# Patient Record
Sex: FEMALE | Race: AMERICAN INDIAN OR ALASKA NATIVE | URBAN - METROPOLITAN AREA
[De-identification: names, ages, dates, MRNs, and addresses within clinical notes are randomized per-mention and may not be internally consistent; named-entity substitution may affect disease eponyms.]

---

## 2019-06-05 RX ORDER — SERTRALINE HYDROCHLORIDE 100 MG/1
100 TABLET, FILM COATED ORAL DAILY
COMMUNITY

## 2019-06-05 RX ORDER — CYCLOBENZAPRINE HCL 10 MG
10 TABLET ORAL 3 TIMES DAILY PRN
Status: ON HOLD | COMMUNITY
End: 2019-09-05

## 2019-06-05 RX ORDER — MELOXICAM 15 MG/1
15 TABLET ORAL DAILY
Status: ON HOLD | COMMUNITY
End: 2019-09-05

## 2019-06-05 RX ORDER — ALBUTEROL SULFATE 90 UG/1
1-2 AEROSOL, METERED RESPIRATORY (INHALATION) EVERY 6 HOURS PRN
COMMUNITY

## 2019-06-05 RX ORDER — BUPROPION HYDROCHLORIDE 150 MG/1
150 TABLET ORAL EVERY MORNING
COMMUNITY

## 2019-06-05 RX ORDER — DIAZEPAM 5 MG
5 TABLET ORAL EVERY 6 HOURS PRN
COMMUNITY

## 2019-06-05 RX ORDER — MULTIVITAMIN WITH IRON
2 TABLET ORAL DAILY PRN
Status: ON HOLD | COMMUNITY
End: 2019-09-05

## 2019-06-05 RX ORDER — ERGOCALCIFEROL 1.25 MG/1
50000 CAPSULE, LIQUID FILLED ORAL
COMMUNITY

## 2019-06-05 RX ORDER — FLUTICASONE PROPIONATE 50 MCG
1 SPRAY, SUSPENSION (ML) NASAL DAILY
Status: ON HOLD | COMMUNITY
End: 2019-09-05

## 2019-06-05 RX ORDER — BUDESONIDE AND FORMOTEROL FUMARATE DIHYDRATE 160; 4.5 UG/1; UG/1
2 AEROSOL RESPIRATORY (INHALATION) 2 TIMES DAILY
COMMUNITY

## 2019-06-05 SDOH — HEALTH STABILITY: MENTAL HEALTH: HOW OFTEN DO YOU HAVE A DRINK CONTAINING ALCOHOL?: NEVER

## 2019-06-06 ENCOUNTER — HOSPITAL ENCOUNTER (OUTPATIENT)
Facility: CLINIC | Age: 34
Discharge: HOME | End: 2019-06-06
Attending: ANESTHESIOLOGY | Admitting: ANESTHESIOLOGY
Payer: COMMERCIAL

## 2019-06-06 ENCOUNTER — APPOINTMENT (OUTPATIENT)
Dept: GENERAL RADIOLOGY | Facility: CLINIC | Age: 34
End: 2019-06-06
Attending: ANESTHESIOLOGY
Payer: COMMERCIAL

## 2019-06-06 ENCOUNTER — ANESTHESIA EVENT (OUTPATIENT)
Dept: SURGERY | Facility: CLINIC | Age: 34
End: 2019-06-06
Payer: COMMERCIAL

## 2019-06-06 ENCOUNTER — ANESTHESIA (OUTPATIENT)
Dept: SURGERY | Facility: CLINIC | Age: 34
End: 2019-06-06
Payer: COMMERCIAL

## 2019-06-06 VITALS
HEIGHT: 65 IN | DIASTOLIC BLOOD PRESSURE: 81 MMHG | BODY MASS INDEX: 21.66 KG/M2 | TEMPERATURE: 99.8 F | SYSTOLIC BLOOD PRESSURE: 124 MMHG | HEART RATE: 98 BPM | OXYGEN SATURATION: 96 % | WEIGHT: 130 LBS | RESPIRATION RATE: 19 BRPM

## 2019-06-06 LAB — HCG UR QL: NEGATIVE

## 2019-06-06 PROCEDURE — 25000125 ZZHC RX 250: Performed by: ANESTHESIOLOGY

## 2019-06-06 PROCEDURE — 25000125 ZZHC RX 250: Performed by: NURSE ANESTHETIST, CERTIFIED REGISTERED

## 2019-06-06 PROCEDURE — C1820 GENERATOR NEURO RECHG BAT SY: HCPCS | Performed by: ANESTHESIOLOGY

## 2019-06-06 PROCEDURE — 25000128 H RX IP 250 OP 636: Performed by: NURSE ANESTHETIST, CERTIFIED REGISTERED

## 2019-06-06 PROCEDURE — 71000012 ZZH RECOVERY PHASE 1 LEVEL 1 FIRST HR: Performed by: ANESTHESIOLOGY

## 2019-06-06 PROCEDURE — 37000008 ZZH ANESTHESIA TECHNICAL FEE, 1ST 30 MIN: Performed by: ANESTHESIOLOGY

## 2019-06-06 PROCEDURE — 27210794 ZZH OR GENERAL SUPPLY STERILE: Performed by: ANESTHESIOLOGY

## 2019-06-06 PROCEDURE — 37000009 ZZH ANESTHESIA TECHNICAL FEE, EACH ADDTL 15 MIN: Performed by: ANESTHESIOLOGY

## 2019-06-06 PROCEDURE — 25000128 H RX IP 250 OP 636: Performed by: ANESTHESIOLOGY

## 2019-06-06 PROCEDURE — 40000277 XR SURGERY CARM FLUORO LESS THAN 5 MIN W STILLS

## 2019-06-06 PROCEDURE — 81025 URINE PREGNANCY TEST: CPT | Performed by: ANESTHESIOLOGY

## 2019-06-06 PROCEDURE — 36000063 ZZH SURGERY LEVEL 4 EA 15 ADDTL MIN: Performed by: ANESTHESIOLOGY

## 2019-06-06 PROCEDURE — 36000065 ZZH SURGERY LEVEL 4 W FLUORO 1ST 30 MIN: Performed by: ANESTHESIOLOGY

## 2019-06-06 PROCEDURE — 71000027 ZZH RECOVERY PHASE 2 EACH 15 MINS: Performed by: ANESTHESIOLOGY

## 2019-06-06 PROCEDURE — C1778 LEAD, NEUROSTIMULATOR: HCPCS | Performed by: ANESTHESIOLOGY

## 2019-06-06 PROCEDURE — 27211024 ZZHC OR SUPPLY OTHER OPNP: Performed by: ANESTHESIOLOGY

## 2019-06-06 PROCEDURE — 25800030 ZZH RX IP 258 OP 636: Performed by: NURSE ANESTHETIST, CERTIFIED REGISTERED

## 2019-06-06 PROCEDURE — 40000170 ZZH STATISTIC PRE-PROCEDURE ASSESSMENT II: Performed by: ANESTHESIOLOGY

## 2019-06-06 DEVICE — IMPLANTABLE DEVICE: Type: IMPLANTABLE DEVICE | Status: FUNCTIONAL

## 2019-06-06 RX ORDER — FENTANYL CITRATE 50 UG/ML
25-50 INJECTION, SOLUTION INTRAMUSCULAR; INTRAVENOUS EVERY 5 MIN PRN
Status: DISCONTINUED | OUTPATIENT
Start: 2019-06-06 | End: 2019-06-06 | Stop reason: HOSPADM

## 2019-06-06 RX ORDER — PROPOFOL 10 MG/ML
INJECTION, EMULSION INTRAVENOUS PRN
Status: DISCONTINUED | OUTPATIENT
Start: 2019-06-06 | End: 2019-06-06

## 2019-06-06 RX ORDER — ONDANSETRON 2 MG/ML
4 INJECTION INTRAMUSCULAR; INTRAVENOUS EVERY 30 MIN PRN
Status: DISCONTINUED | OUTPATIENT
Start: 2019-06-06 | End: 2019-06-06 | Stop reason: HOSPADM

## 2019-06-06 RX ORDER — FENTANYL CITRATE 50 UG/ML
INJECTION, SOLUTION INTRAMUSCULAR; INTRAVENOUS PRN
Status: DISCONTINUED | OUTPATIENT
Start: 2019-06-06 | End: 2019-06-06

## 2019-06-06 RX ORDER — SODIUM CHLORIDE, SODIUM LACTATE, POTASSIUM CHLORIDE, CALCIUM CHLORIDE 600; 310; 30; 20 MG/100ML; MG/100ML; MG/100ML; MG/100ML
INJECTION, SOLUTION INTRAVENOUS CONTINUOUS PRN
Status: DISCONTINUED | OUTPATIENT
Start: 2019-06-06 | End: 2019-06-06

## 2019-06-06 RX ORDER — SCOLOPAMINE TRANSDERMAL SYSTEM 1 MG/1
1 PATCH, EXTENDED RELEASE TRANSDERMAL ONCE
Status: COMPLETED | OUTPATIENT
Start: 2019-06-06 | End: 2019-06-06

## 2019-06-06 RX ORDER — PROPOFOL 10 MG/ML
INJECTION, EMULSION INTRAVENOUS CONTINUOUS PRN
Status: DISCONTINUED | OUTPATIENT
Start: 2019-06-06 | End: 2019-06-06

## 2019-06-06 RX ORDER — DEXTROSE MONOHYDRATE 25 G/50ML
25-50 INJECTION, SOLUTION INTRAVENOUS
Status: DISCONTINUED | OUTPATIENT
Start: 2019-06-06 | End: 2019-06-06 | Stop reason: HOSPADM

## 2019-06-06 RX ORDER — CEFAZOLIN SODIUM 2 G/100ML
2 INJECTION, SOLUTION INTRAVENOUS
Status: COMPLETED | OUTPATIENT
Start: 2019-06-06 | End: 2019-06-06

## 2019-06-06 RX ORDER — ONDANSETRON 4 MG/1
4 TABLET, ORALLY DISINTEGRATING ORAL EVERY 30 MIN PRN
Status: DISCONTINUED | OUTPATIENT
Start: 2019-06-06 | End: 2019-06-06 | Stop reason: HOSPADM

## 2019-06-06 RX ORDER — LIDOCAINE HYDROCHLORIDE 20 MG/ML
INJECTION, SOLUTION INFILTRATION; PERINEURAL PRN
Status: DISCONTINUED | OUTPATIENT
Start: 2019-06-06 | End: 2019-06-06

## 2019-06-06 RX ORDER — NALOXONE HYDROCHLORIDE 0.4 MG/ML
.1-.4 INJECTION, SOLUTION INTRAMUSCULAR; INTRAVENOUS; SUBCUTANEOUS
Status: DISCONTINUED | OUTPATIENT
Start: 2019-06-06 | End: 2019-06-06 | Stop reason: HOSPADM

## 2019-06-06 RX ORDER — HYDROMORPHONE HYDROCHLORIDE 1 MG/ML
.3-.5 INJECTION, SOLUTION INTRAMUSCULAR; INTRAVENOUS; SUBCUTANEOUS EVERY 10 MIN PRN
Status: DISCONTINUED | OUTPATIENT
Start: 2019-06-06 | End: 2019-06-06 | Stop reason: HOSPADM

## 2019-06-06 RX ORDER — LIDOCAINE 40 MG/G
CREAM TOPICAL
Status: DISCONTINUED | OUTPATIENT
Start: 2019-06-06 | End: 2019-06-06 | Stop reason: HOSPADM

## 2019-06-06 RX ORDER — SODIUM CHLORIDE, SODIUM LACTATE, POTASSIUM CHLORIDE, CALCIUM CHLORIDE 600; 310; 30; 20 MG/100ML; MG/100ML; MG/100ML; MG/100ML
INJECTION, SOLUTION INTRAVENOUS CONTINUOUS
Status: DISCONTINUED | OUTPATIENT
Start: 2019-06-06 | End: 2019-06-06 | Stop reason: HOSPADM

## 2019-06-06 RX ORDER — NICOTINE POLACRILEX 4 MG
15-30 LOZENGE BUCCAL
Status: DISCONTINUED | OUTPATIENT
Start: 2019-06-06 | End: 2019-06-06 | Stop reason: HOSPADM

## 2019-06-06 RX ORDER — ONDANSETRON 2 MG/ML
INJECTION INTRAMUSCULAR; INTRAVENOUS PRN
Status: DISCONTINUED | OUTPATIENT
Start: 2019-06-06 | End: 2019-06-06

## 2019-06-06 RX ORDER — DEXAMETHASONE SODIUM PHOSPHATE 4 MG/ML
INJECTION, SOLUTION INTRA-ARTICULAR; INTRALESIONAL; INTRAMUSCULAR; INTRAVENOUS; SOFT TISSUE PRN
Status: DISCONTINUED | OUTPATIENT
Start: 2019-06-06 | End: 2019-06-06

## 2019-06-06 RX ADMIN — MIDAZOLAM 2 MG: 1 INJECTION INTRAMUSCULAR; INTRAVENOUS at 14:26

## 2019-06-06 RX ADMIN — DEXMEDETOMIDINE HYDROCHLORIDE 8 MCG: 100 INJECTION, SOLUTION INTRAVENOUS at 14:30

## 2019-06-06 RX ADMIN — CEFAZOLIN SODIUM 2 G: 2 INJECTION, SOLUTION INTRAVENOUS at 14:43

## 2019-06-06 RX ADMIN — PHENYLEPHRINE HYDROCHLORIDE 100 MCG: 10 INJECTION INTRAVENOUS at 14:49

## 2019-06-06 RX ADMIN — DEXMEDETOMIDINE HYDROCHLORIDE 4 MCG: 100 INJECTION, SOLUTION INTRAVENOUS at 14:44

## 2019-06-06 RX ADMIN — ONDANSETRON 4 MG: 2 INJECTION INTRAMUSCULAR; INTRAVENOUS at 15:10

## 2019-06-06 RX ADMIN — FENTANYL CITRATE 50 MCG: 50 INJECTION, SOLUTION INTRAMUSCULAR; INTRAVENOUS at 17:21

## 2019-06-06 RX ADMIN — FENTANYL CITRATE 50 MCG: 50 INJECTION, SOLUTION INTRAMUSCULAR; INTRAVENOUS at 15:46

## 2019-06-06 RX ADMIN — SODIUM CHLORIDE, POTASSIUM CHLORIDE, SODIUM LACTATE AND CALCIUM CHLORIDE: 600; 310; 30; 20 INJECTION, SOLUTION INTRAVENOUS at 14:26

## 2019-06-06 RX ADMIN — SCOPALAMINE 1 PATCH: 1 PATCH, EXTENDED RELEASE TRANSDERMAL at 14:22

## 2019-06-06 RX ADMIN — PROPOFOL 100 MCG/KG/MIN: 10 INJECTION, EMULSION INTRAVENOUS at 14:29

## 2019-06-06 RX ADMIN — PROPOFOL 20 MG: 10 INJECTION, EMULSION INTRAVENOUS at 14:41

## 2019-06-06 RX ADMIN — DEXAMETHASONE SODIUM PHOSPHATE 4 MG: 4 INJECTION, SOLUTION INTRA-ARTICULAR; INTRALESIONAL; INTRAMUSCULAR; INTRAVENOUS; SOFT TISSUE at 15:10

## 2019-06-06 RX ADMIN — DEXMEDETOMIDINE HYDROCHLORIDE 12 MCG: 100 INJECTION, SOLUTION INTRAVENOUS at 15:50

## 2019-06-06 RX ADMIN — FENTANYL CITRATE 50 MCG: 50 INJECTION, SOLUTION INTRAMUSCULAR; INTRAVENOUS at 14:37

## 2019-06-06 RX ADMIN — DEXMEDETOMIDINE HYDROCHLORIDE 8 MCG: 100 INJECTION, SOLUTION INTRAVENOUS at 14:37

## 2019-06-06 RX ADMIN — LIDOCAINE HYDROCHLORIDE 80 MG: 20 INJECTION, SOLUTION INFILTRATION; PERINEURAL at 14:29

## 2019-06-06 ASSESSMENT — LIFESTYLE VARIABLES: TOBACCO_USE: 1

## 2019-06-06 ASSESSMENT — COPD QUESTIONNAIRES: COPD: 0

## 2019-06-06 ASSESSMENT — ENCOUNTER SYMPTOMS: SEIZURES: 1

## 2019-06-06 ASSESSMENT — MIFFLIN-ST. JEOR: SCORE: 1282.62

## 2019-06-06 NOTE — PROGRESS NOTES
Admission medication history interview status for the 6/6/2019  admission is complete. See EPIC admission navigator for prior to admission medications     Medication history source reliability:Moderate    Medication history interview source(s):Patient    Medication history resources (including written lists, pill bottles, clinic record): Written list    [facility]    Additional medication history information not noted on PTA med list :   Pt brought in Albuterol inhaler    Time spent in this activity: 45 mins    Prior to Admission medications    Medication Sig Last Dose Taking? Auth Provider   albuterol (PROAIR HFA/PROVENTIL HFA/VENTOLIN HFA) 108 (90 Base) MCG/ACT inhaler Inhale 1-2 puffs into the lungs every 6 hours as needed for shortness of breath / dyspnea or wheezing Past Week at prn Yes Reported, Patient   budesonide-formoterol (SYMBICORT) 160-4.5 MCG/ACT Inhaler Inhale 2 puffs into the lungs 2 times daily over a month Yes Reported, Patient   Buprenorphine HCl (BELBUCA) 75 MCG FILM buccal film Place 75 mcg inside cheek every 12 hours 6/5/2019 at 2200 Yes Reported, Patient   buPROPion (WELLBUTRIN XL) 150 MG 24 hr tablet Take 150 mg by mouth every morning 6/6/2019 at 0800 Yes Reported, Patient   cyclobenzaprine (FLEXERIL) 10 MG tablet Take 10 mg by mouth 3 times daily as needed for muscle spasms 6/4/2019 at prn Yes Reported, Patient   diazepam (VALIUM) 5 MG tablet Take 5 mg by mouth every 6 hours as needed for anxiety 6/5/2019 at 1800 Yes Reported, Patient   fluticasone (FLONASE) 50 MCG/ACT nasal spray Spray 1 spray into both nostrils daily  over a month Yes Reported, Patient   ibuprofen (ADVIL/MOTRIN) 800 MG tablet Take 800 mg by mouth every 8 hours as needed  6/5/2019 at 1400 Yes Reported, Patient   lisdexamfetamine (VYVANSE) 70 MG capsule Take 70 mg by mouth every morning  6/5/2019 at 1000 Yes Reported, Patient   magnesium 250 MG tablet Take 2 tablets by mouth daily as needed  Past  Week at prn Yes Reported, Patient   meloxicam (MOBIC) 15 MG tablet Take 15 mg by mouth daily 5/30/2019 Yes Reported, Patient   nicotine (NICOTROL) 10 MG/ML SOLN inhalation solution Spray 1 spray in nostril every 2 hours as needed for smoking cessation 6/5/2019 at 2200 Yes Reported, Patient   SERTRALINE HCL PO Take 150 mg by mouth daily  6/6/2019 at 0800 Yes Reported, Patient   vitamin D2 (ERGOCALCIFEROL) 48239 units (1250 mcg) capsule Take 50,000 Units by mouth twice a week (tuesdays & Thursdays) over a week Yes Reported, Patient

## 2019-06-06 NOTE — ANESTHESIA PREPROCEDURE EVALUATION
Anesthesia Pre-Procedure Evaluation    Patient: Linda Franklin   MRN: [id number] : 1985          Preoperative Diagnosis: INTERVERTEBRAL DISC DISORDER WOTH RADICULOPATHY    Procedure(s):  EXPLANT (NUVERTRA) SPINAL CORD STIMULATOR AND REPLACEMENT, SPINAL CORD STIMULATOR, DORSAL COLUMN ([employer])    Past Medical History:   Diagnosis Date     Abnormal Pap smear      ADHD      Adjustment disorder      Anxiety      Asthma      Bipolar 2 disorder (H)      Borderline personality disorder (H)      Chlamydia      Chronic back pain      Depression, major      Endometriosis      Endometriosis      Genital herpes      HTN (hypertension)      Lumbar disc disease      Personality disorder (H)      PTSD (post-traumatic stress disorder)      PTSD (post-traumatic stress disorder)      Renal cyst      Renal cyst      Seizure (H)      Spinal cord stimulator status      Past Surgical History:   Procedure Laterality Date     ABDOMEN SURGERY       x 2     CHOLECYSTECTOMY       GYN SURGERY      Nexplanon Implanted left arm-expires 18     HC INSERTION INTRAUTERINE DEVICE       PELVIS LAPAROSCOPY,DX      x3       Anesthesia Evaluation     . Pt has had prior anesthetic. Type: General    No history of anesthetic complications          ROS/MED HX    ENT/Pulmonary:     (+)tobacco use, Current use asthma , . .   (-) COPD   Neurologic:     (+)seizures (grand mal/ petitie mal, may be pseudoseizures) last seizure: >10 years    (-) CVA, TIA and Neuropathy   Cardiovascular:     (+) hypertension----. : . . . :. .      (-) CAD, irregular heartbeat/palpitations and stent   METS/Exercise Tolerance:     Hematologic:        (-) anemia   Musculoskeletal:         GI/Hepatic:        (-) GERD and liver disease   Renal/Genitourinary: Comment: Renal cyst     (-) renal disease   Endo:      (-) Type I DM, Type II DM and thyroid disease   Psychiatric:     (+) psychiatric history bipolar      Infectious Disease:  - neg infectious  "disease ROS       Malignancy:         Other: Comment: Lupus   (+) H/O Chronic Pain,H/O chronic opiod use ,                         Physical Exam  Normal systems: cardiovascular and pulmonary    Airway   Mallampati: II  TM distance: >3 FB  Neck ROM: full    Dental   (+) missing and upper dentures    Cardiovascular   Rhythm and rate: regular and normal      Pulmonary    breath sounds clear to auscultation            Lab Results   Component Value Date    HCG Negative 06/06/2019       Preop Vitals  BP Readings from Last 3 Encounters:   06/06/19 124/73    Pulse Readings from Last 3 Encounters:   06/06/19 85      Resp Readings from Last 3 Encounters:   06/06/19 16    SpO2 Readings from Last 3 Encounters:   06/06/19 98%      Temp Readings from Last 1 Encounters:   06/06/19 36.6  C (97.9  F) (Oral)    Ht Readings from Last 1 Encounters:   06/06/19 1.638 m (5' 4.5\")      Wt Readings from Last 1 Encounters:   06/06/19 59 kg (130 lb)    Estimated body mass index is 21.97 kg/m  as calculated from the following:    Height as of this encounter: 1.638 m (5' 4.5\").    Weight as of this encounter: 59 kg (130 lb).       Anesthesia Plan      History & Physical Review  History and physical reviewed and following examination; no interval change.    ASA Status:  2 .    NPO Status:  > 6 hours    Plan for MAC (with GA backup) with Intravenous induction. Maintenance will be TIVA.    PONV prophylaxis:  Ondansetron (or other 5HT-3), Dexamethasone or Solumedrol and Scopolamine patch       Postoperative Care  Postoperative pain management:  Oral pain medications.      Consents  Anesthetic plan, risks, benefits and alternatives discussed with:  Patient (Including possibility of intraoperative awareness or recall.)..                 Yunior Arora MD  "

## 2019-06-06 NOTE — DISCHARGE INSTRUCTIONS
Information for Patients Discharging with a Transderm Scopolamine Patch     Dry mouth is a common side effect.    Drowsiness is another common side effect especially when combined with pain medication.  Please avoid activities that require mental alertness such as driving a car or making important legal decisions.    Since Scopolamine can cause temporary dilation of the pupils and blurred vision if it comes in contact with the eyes; be sure to wash your hands thoroughly with soap and water immediately after handling the patch.   When you remove your patch, please stick it to a tissue or paper towel for disposal.      Remove the patch immediately and contact a physician in the unlikely event that you experience symptoms of acute glaucoma (pain and reddening of the eyes, accompanied by dilated pupils).    Remove the patch if you develop any difficulties urinating.  If you cannot urinate after removing your patch, please notify your surgeon.    Remove the patch 24 hours after surgery.      Same Day Surgery Discharge Instructions for  Sedation and General Anesthesia       It's not unusual to feel dizzy, light-headed or faint for up to 24 hours after surgery or while taking pain medication.  If you have these symptoms: sit for a few minutes before standing and have someone assist you when you get up to walk or use the bathroom.      You should rest and relax for the next 24 hours. We recommend you make arrangements to have an adult stay with you for at least 24 hours after your discharge.  Avoid hazardous and strenuous activity.      DO NOT DRIVE any vehicle or operate mechanical equipment for 24 hours following the end of your surgery.  Even though you may feel normal, your reactions may be affected by the medication you have received.      Do not drink alcoholic beverages for 24 hours following surgery.       Slowly progress to your regular diet as you feel able. It's not unusual to feel nauseated and/or vomit after  receiving anesthesia.  If you develop these symptoms, drink clear liquids (apple juice, ginger ale, broth, 7-up, etc. ) until you feel better.  If your nausea and vomiting persists for 24 hours, please notify your surgeon.        All narcotic pain medications, along with inactivity and anesthesia, can cause constipation. Drinking plenty of liquids and increasing fiber intake will help.      For any questions of a medical nature, call your surgeon.      Do not make important decisions for 24 hours.      If you had general anesthesia, you may have a sore throat for a couple of days related to the breathing tube used during surgery.  You may use Cepacol lozenges to help with this discomfort.  If it worsens or if you develop a fever, contact your surgeon.       If you feel your pain is not well managed with the pain medications prescribed by your surgeon, please contact your surgeon's office to let them know so they can address your concerns.         Spinal Cord Stimulator Implant Discharge Instruction    After Surgery:     Resume light activity as tolerated    Restrict bending, lifting and twisting to reduce the chance of your leads moving for 6-8 weeks    Resume your regular pain medication regimen    Begin taking your oral antibiotic the day after your surgery.  It is important you finish the entire course of medication until gone.    Driving is not recommended while your stimulator is turned on    A small amount of blood visible on the bandages is normal, if any leaks out of the bandage contact [facility]    Site Care:    DO NOT remove any bandages from the surgery.  A RN or your provider will remove the bandages at your follow-up appointment at [facility].    Your incisions have been closed with dissolvable stitches.  There are steri-strips covering your incision under your bandages.  Do not remove these, they will fall off on their own in 5-7 days.    After the bandages are removed, check  your incision sites daily.  Keep sites clean and dry.    During the first few days, you may notice some swelling or discoloration around the incision sites, which is normal.  Some fluid (yellow to light red to orange) may ooze or leak from the incisions. However, if the fluid is foul smelling, thick, or does not decrease in amount, call [facility].    DO NOT shower until your bandages have been removed.    NO soaking your incisions for 2 weeks.  This includes baths, whirlpools, swimming pools, and hot tubs.    Call [facility] during business hours or the on-call call provider after hours if you develop any of the following:    Signs of infection such as: fevers, chills, increased pain, drainage (as mentioned above), redness, and swelling from your incision sites.    Headache persisting for more than 48 hours    Unusual changes in or stopping of sensation in your legs or back.     Uncontrolled pain.     Signs or symptoms of a deep vein thrombosis (DVT) : swelling in one orbrboth legs, pain or tenderness in one or both legs, warm skin on your leg, and/or red or discolored skin on your leg    [facility]  [phone]    After hours on-call provider     Emergency Situations go to the Emergency Department or Call 911:    Sudden severe back pain    Sudden onset of leg weakness and spasms    Loss of bladder control and/or bowel function    Signs or symptoms or a pulmonary embolism (PE): sudden coughing --which may bring up blood, sharp chest pain, rapid breathing or shortness of breath, and/or severe light headedness         **If you have questions or concerns about your procedure,  call Dr. Watson at [phone]**

## 2019-06-06 NOTE — ANESTHESIA CARE TRANSFER NOTE
Patient: Linda Franklin    Procedure(s):  EXPLANT (NUVERTRA) SPINAL CORD STIMULATOR AND REPLACEMENT, SPINAL CORD STIMULATOR, DORSAL COLUMN ([employer])    Diagnosis: INTERVERTEBRAL DISC DISORDER WOTH RADICULOPATHY  Diagnosis Additional Information: No value filed.    Anesthesia Type:   MAC     Note:  Airway :Room Air  Patient transferred to:PACU  Handoff Report: Identifed the Patient, Identified the Reponsible Provider, Reviewed the pertinent medical history, Discussed the surgical course, Reviewed Intra-OP anesthesia mangement and issues during anesthesia, Set expectations for post-procedure period and Allowed opportunity for questions and acknowledgement of understanding      Vitals: (Last set prior to Anesthesia Care Transfer)    CRNA VITALS  6/6/2019 1611 - 6/6/2019 1642      6/6/2019             Resp Rate (set):  10                Electronically Signed By: EMMETT Bocanegra CRNA  June 6, 2019  4:42 PM

## 2019-06-07 NOTE — ANESTHESIA POSTPROCEDURE EVALUATION
Patient: Linda Franklin    Procedure(s):  EXPLANT (NUVERTRA) SPINAL CORD STIMULATOR AND REPLACEMENT, SPINAL CORD STIMULATOR, DORSAL COLUMN ([employer])    Diagnosis:INTERVERTEBRAL DISC DISORDER WOTH RADICULOPATHY  Diagnosis Additional Information: No value filed.    Anesthesia Type:  MAC    Note:  Anesthesia Post Evaluation    Patient location during evaluation: PACU  Patient participation: Able to fully participate in evaluation  Level of consciousness: awake and alert  Pain management: adequate  Airway patency: patent  Cardiovascular status: acceptable  Respiratory status: acceptable  Hydration status: acceptable  PONV: none     Anesthetic complications: None          Last vitals:  Vitals:    06/06/19 1715 06/06/19 1730 06/06/19 1745   BP: 120/80 122/85 124/81   Pulse: 81 93 98   Resp: 19 12 19   Temp: 37.7  C (99.9  F) 37.7  C (99.8  F)    SpO2: 95% 96% 96%         Electronically Signed By: William Swanson MD  June 6, 2019  10:25 PM

## 2019-06-28 NOTE — OP NOTE
Name:    Linda Franklin    :    1985  Procedure Date: 2019   Surgeon:   Aguilar Watson MD  Location:   [facility]  Procedure:   Nuvectra Spinal Cord Stimulator Explant and [employer] Spinal Cord Stimulator     Implant- cervical and thoracic  Indication:    Intervertebral disc disorders w radiculopathy, lumbar region M51.16      Allergies:  Ingredient Reaction (Severity) Medication Name Comment   CODEINE PHOSPHATE seizure Tylenol-Codeine #3    FLUTICASONE PROPIONATE increased asthma symptoms Advair Diskus    LATEX      SALMETEROL XINAFOATE increased asthma symptoms Advair Diskus        Anesthesia: MAC sedation and local anesthetic were utilized for this procedure.     Explants: All explants were [employer] products. The lead lot numbers were [id number], [id number], and [id number]. The Nuvectra implantable pulse generator was serial number [id number].    All implants were [employer] products. The lead serial numbers were SC-2352-70 SN [id number], SC-2352-70 SN [id number], SC-2352-50 SN [id number], and SC-2352-50 SN [id number].     Description of Procedure: After discussing potential risks and benefits, the patient did wish to proceed and signed a written consent form.  The operative site was marked in the preoperative area. A [employer] representative met with the patient and answered all questions. An antibiotic prescription was given for the patient to use for the next 10 days for prophylaxis. The patient was brought into the operating room and positioned prone on the operating table taking care to relieve all pressure points and place extremities in a comfortable position.  MAC anesthesia was induced and the appropriate level was identified using fluoroscopy.  Aguilar Watson MD initiated a surgical time out stating that patients name, date of birth, allergies, and procedure to be performed. Xylocaine was injected to provider anesthesia. The  with the battery was opened. The leads  were disconnected. The Nuvectra battery was removed. A paramedian incision was then made in the mid back below this lulu.  Additional Xylocaine was injected to provide additional anesthesia.  The (3) Eight contact compact spinal cord stimulator leads were removed.    We then proceeded with implanting the new [employer] spinal cord stimulator. A 14 gauge Tuohy needle was then introduced under fluoroscopy. The T2-T3 epidural space was entered as a loss of resistance was felt with an air filled syringe. The Eight contact compact spinal cord stimulator lead was advanced to the C3/C4/C5 vertebral level. This lead was left of midline. A second Eight contact compact spinal cord stimulator lead was placed through the T2-T3 epidural space and advanced to the C3/C4/C5 vertebral level. This lead was right of midline. A third Eight contact compact spinal cord stimulator lead was placed through the T12-L1 epidural space and advanced to the top of the T7-T8 vertebral level. This lead was right of midline. Lateral and AP fluoroscopy views were obtained and confirmed appropriate posterior epidural placement.  A fourth Eight contact compact spinal cord stimulator lead was placed through the T12-L1 epidural space and advanced to the top of the T7-T8 vertebral level. This lead was left of midline. Lateral and AP fluoroscopy views were obtained and confirmed appropriate posterior epidural placement.      The anchors were placed and tightened around the leads using and then to the fascia using 2.0 silk sutures. A superficial gluteal pocket was developed on the left side.  A subcutaneous tunnel was made and the cables were pulled through to the gluteal pocket. The cable ends were connected to the IPG and all connections were secured to torque. An impedance check was performed and all impedances were within normal limits. The IPG and excess cable were then placed into the gluteal pocket with the excess cable coiled behind the IPG.  The wounds were then irrigated profusely with saline solution. The incisions were closed in layers using absorbable sutures. Steri-strips were used as well as sutures. Final X-ray revealed no significant changes in lead positions.      The patient was awakened, positioned supine and delivered to the recovery  room. Discharge post-op care instructions were reviewed with the patient, and the patient verbalized understanding and was provided with a written copy. IV was discontinued with catheter intact and patient was then discharged.     The wounds were then irrigated profusely with saline solution. The incisions were closed in layers using absorbable sutures. Steri-strips and island dressings were also applied.      The patient was awakened, positioned supine, and brought back to the recovery area where they would be monitored by a registered nurse. Physiologic parameters were observed utilizing pulse oximetry and blood pressure checks every ten minutes in recovery. Monitoring revealed normal oxygen saturation and pulse rate. The patient tolerated the procedure well.  The patient was able to ambulate without assistance. Post procedure instructions were given to the patient and the patient verbalized understanding. All questions were answered.    Aguilar Watson MD   June 6, 2019

## 2019-09-04 RX ORDER — OMEPRAZOLE 40 MG/1
40 CAPSULE, DELAYED RELEASE ORAL DAILY PRN
COMMUNITY

## 2019-09-04 RX ORDER — ONDANSETRON 4 MG/1
4 TABLET, ORALLY DISINTEGRATING ORAL EVERY 8 HOURS PRN
Status: ON HOLD | COMMUNITY
End: 2019-09-05

## 2019-09-04 RX ORDER — MIRTAZAPINE 15 MG/1
15 TABLET, FILM COATED ORAL AT BEDTIME
COMMUNITY

## 2019-09-05 ENCOUNTER — ANESTHESIA (OUTPATIENT)
Dept: SURGERY | Facility: CLINIC | Age: 34
End: 2019-09-05
Payer: COMMERCIAL

## 2019-09-05 ENCOUNTER — ANESTHESIA EVENT (OUTPATIENT)
Dept: SURGERY | Facility: CLINIC | Age: 34
End: 2019-09-05
Payer: COMMERCIAL

## 2019-09-05 ENCOUNTER — APPOINTMENT (OUTPATIENT)
Dept: GENERAL RADIOLOGY | Facility: CLINIC | Age: 34
End: 2019-09-05
Attending: PHYSICAL MEDICINE & REHABILITATION
Payer: COMMERCIAL

## 2019-09-05 ENCOUNTER — HOSPITAL ENCOUNTER (OUTPATIENT)
Facility: CLINIC | Age: 34
Discharge: HOME | End: 2019-09-05
Attending: PHYSICAL MEDICINE & REHABILITATION | Admitting: PHYSICAL MEDICINE & REHABILITATION
Payer: COMMERCIAL

## 2019-09-05 VITALS
WEIGHT: 138 LBS | HEIGHT: 64 IN | RESPIRATION RATE: 12 BRPM | HEART RATE: 53 BPM | DIASTOLIC BLOOD PRESSURE: 67 MMHG | BODY MASS INDEX: 23.56 KG/M2 | SYSTOLIC BLOOD PRESSURE: 120 MMHG | TEMPERATURE: 97.6 F | OXYGEN SATURATION: 98 %

## 2019-09-05 LAB — HCG UR QL: NEGATIVE

## 2019-09-05 PROCEDURE — 25000128 H RX IP 250 OP 636: Performed by: ANESTHESIOLOGY

## 2019-09-05 PROCEDURE — 37000008 ZZH ANESTHESIA TECHNICAL FEE, 1ST 30 MIN: Performed by: PHYSICAL MEDICINE & REHABILITATION

## 2019-09-05 PROCEDURE — 81025 URINE PREGNANCY TEST: CPT | Performed by: PHYSICAL MEDICINE & REHABILITATION

## 2019-09-05 PROCEDURE — 40000170 ZZH STATISTIC PRE-PROCEDURE ASSESSMENT II: Performed by: PHYSICAL MEDICINE & REHABILITATION

## 2019-09-05 PROCEDURE — 37000009 ZZH ANESTHESIA TECHNICAL FEE, EACH ADDTL 15 MIN: Performed by: PHYSICAL MEDICINE & REHABILITATION

## 2019-09-05 PROCEDURE — 25000125 ZZHC RX 250: Performed by: NURSE ANESTHETIST, CERTIFIED REGISTERED

## 2019-09-05 PROCEDURE — 25000128 H RX IP 250 OP 636: Performed by: PHYSICAL MEDICINE & REHABILITATION

## 2019-09-05 PROCEDURE — 25000132 ZZH RX MED GY IP 250 OP 250 PS 637: Performed by: ANESTHESIOLOGY

## 2019-09-05 PROCEDURE — 71000012 ZZH RECOVERY PHASE 1 LEVEL 1 FIRST HR: Performed by: PHYSICAL MEDICINE & REHABILITATION

## 2019-09-05 PROCEDURE — 36000060 ZZH SURGERY LEVEL 3 W FLUORO 1ST 30 MIN: Performed by: PHYSICAL MEDICINE & REHABILITATION

## 2019-09-05 PROCEDURE — 36000058 ZZH SURGERY LEVEL 3 EA 15 ADDTL MIN: Performed by: PHYSICAL MEDICINE & REHABILITATION

## 2019-09-05 PROCEDURE — 25000128 H RX IP 250 OP 636: Performed by: NURSE ANESTHETIST, CERTIFIED REGISTERED

## 2019-09-05 PROCEDURE — 71000027 ZZH RECOVERY PHASE 2 EACH 15 MINS: Performed by: PHYSICAL MEDICINE & REHABILITATION

## 2019-09-05 PROCEDURE — 27210794 ZZH OR GENERAL SUPPLY STERILE: Performed by: PHYSICAL MEDICINE & REHABILITATION

## 2019-09-05 PROCEDURE — 40000277 XR SURGERY CARM FLUORO LESS THAN 5 MIN W STILLS

## 2019-09-05 PROCEDURE — 25800030 ZZH RX IP 258 OP 636: Performed by: NURSE ANESTHETIST, CERTIFIED REGISTERED

## 2019-09-05 PROCEDURE — 25000125 ZZHC RX 250: Performed by: ANESTHESIOLOGY

## 2019-09-05 RX ORDER — ALBUTEROL SULFATE 90 UG/1
2 AEROSOL, METERED RESPIRATORY (INHALATION) ONCE
Status: DISCONTINUED | OUTPATIENT
Start: 2019-09-05 | End: 2019-09-05 | Stop reason: HOSPADM

## 2019-09-05 RX ORDER — NICOTINE POLACRILEX 4 MG
15-30 LOZENGE BUCCAL
Status: DISCONTINUED | OUTPATIENT
Start: 2019-09-05 | End: 2019-09-05 | Stop reason: HOSPADM

## 2019-09-05 RX ORDER — HYDROXYZINE HYDROCHLORIDE 25 MG/1
50 TABLET, FILM COATED ORAL EVERY 6 HOURS PRN
Status: DISCONTINUED | OUTPATIENT
Start: 2019-09-05 | End: 2019-09-05 | Stop reason: HOSPADM

## 2019-09-05 RX ORDER — LIDOCAINE 40 MG/G
CREAM TOPICAL
Status: DISCONTINUED | OUTPATIENT
Start: 2019-09-05 | End: 2019-09-05 | Stop reason: HOSPADM

## 2019-09-05 RX ORDER — LABETALOL HYDROCHLORIDE 5 MG/ML
10 INJECTION, SOLUTION INTRAVENOUS
Status: DISCONTINUED | OUTPATIENT
Start: 2019-09-05 | End: 2019-09-05 | Stop reason: HOSPADM

## 2019-09-05 RX ORDER — ONDANSETRON 2 MG/ML
INJECTION INTRAMUSCULAR; INTRAVENOUS PRN
Status: DISCONTINUED | OUTPATIENT
Start: 2019-09-05 | End: 2019-09-05

## 2019-09-05 RX ORDER — MEPERIDINE HYDROCHLORIDE 25 MG/ML
12.5 INJECTION INTRAMUSCULAR; INTRAVENOUS; SUBCUTANEOUS
Status: DISCONTINUED | OUTPATIENT
Start: 2019-09-05 | End: 2019-09-05 | Stop reason: HOSPADM

## 2019-09-05 RX ORDER — HYDROMORPHONE HYDROCHLORIDE 1 MG/ML
.3-.5 INJECTION, SOLUTION INTRAMUSCULAR; INTRAVENOUS; SUBCUTANEOUS EVERY 10 MIN PRN
Status: DISCONTINUED | OUTPATIENT
Start: 2019-09-05 | End: 2019-09-05 | Stop reason: HOSPADM

## 2019-09-05 RX ORDER — CEFAZOLIN SODIUM 2 G/100ML
2 INJECTION, SOLUTION INTRAVENOUS
Status: COMPLETED | OUTPATIENT
Start: 2019-09-05 | End: 2019-09-05

## 2019-09-05 RX ORDER — CEFAZOLIN SODIUM 2 G/100ML
2 INJECTION, SOLUTION INTRAVENOUS
Status: DISCONTINUED | OUTPATIENT
Start: 2019-09-05 | End: 2019-09-05 | Stop reason: HOSPADM

## 2019-09-05 RX ORDER — SODIUM CHLORIDE, SODIUM LACTATE, POTASSIUM CHLORIDE, CALCIUM CHLORIDE 600; 310; 30; 20 MG/100ML; MG/100ML; MG/100ML; MG/100ML
INJECTION, SOLUTION INTRAVENOUS CONTINUOUS PRN
Status: DISCONTINUED | OUTPATIENT
Start: 2019-09-05 | End: 2019-09-05

## 2019-09-05 RX ORDER — NALOXONE HYDROCHLORIDE 0.4 MG/ML
.1-.4 INJECTION, SOLUTION INTRAMUSCULAR; INTRAVENOUS; SUBCUTANEOUS
Status: DISCONTINUED | OUTPATIENT
Start: 2019-09-05 | End: 2019-09-05 | Stop reason: HOSPADM

## 2019-09-05 RX ORDER — LIDOCAINE HYDROCHLORIDE 20 MG/ML
INJECTION, SOLUTION INFILTRATION; PERINEURAL PRN
Status: DISCONTINUED | OUTPATIENT
Start: 2019-09-05 | End: 2019-09-05

## 2019-09-05 RX ORDER — FENTANYL CITRATE 0.05 MG/ML
25-50 INJECTION, SOLUTION INTRAMUSCULAR; INTRAVENOUS
Status: DISCONTINUED | OUTPATIENT
Start: 2019-09-05 | End: 2019-09-05 | Stop reason: HOSPADM

## 2019-09-05 RX ORDER — FENTANYL CITRATE 50 UG/ML
INJECTION, SOLUTION INTRAMUSCULAR; INTRAVENOUS PRN
Status: DISCONTINUED | OUTPATIENT
Start: 2019-09-05 | End: 2019-09-05

## 2019-09-05 RX ORDER — PROPOFOL 10 MG/ML
INJECTION, EMULSION INTRAVENOUS CONTINUOUS PRN
Status: DISCONTINUED | OUTPATIENT
Start: 2019-09-05 | End: 2019-09-05

## 2019-09-05 RX ORDER — DEXTROSE MONOHYDRATE 25 G/50ML
25-50 INJECTION, SOLUTION INTRAVENOUS
Status: DISCONTINUED | OUTPATIENT
Start: 2019-09-05 | End: 2019-09-05 | Stop reason: HOSPADM

## 2019-09-05 RX ORDER — OXYCODONE AND ACETAMINOPHEN 5; 325 MG/1; MG/1
1 TABLET ORAL EVERY 6 HOURS PRN
COMMUNITY

## 2019-09-05 RX ORDER — SCOLOPAMINE TRANSDERMAL SYSTEM 1 MG/1
1 PATCH, EXTENDED RELEASE TRANSDERMAL
Status: DISCONTINUED | OUTPATIENT
Start: 2019-09-05 | End: 2019-09-05 | Stop reason: HOSPADM

## 2019-09-05 RX ORDER — SODIUM CHLORIDE, SODIUM LACTATE, POTASSIUM CHLORIDE, CALCIUM CHLORIDE 600; 310; 30; 20 MG/100ML; MG/100ML; MG/100ML; MG/100ML
INJECTION, SOLUTION INTRAVENOUS CONTINUOUS
Status: DISCONTINUED | OUTPATIENT
Start: 2019-09-05 | End: 2019-09-05 | Stop reason: HOSPADM

## 2019-09-05 RX ORDER — ALBUTEROL SULFATE 0.83 MG/ML
2.5 SOLUTION RESPIRATORY (INHALATION) EVERY 4 HOURS PRN
Status: DISCONTINUED | OUTPATIENT
Start: 2019-09-05 | End: 2019-09-05 | Stop reason: HOSPADM

## 2019-09-05 RX ORDER — ONDANSETRON 4 MG/1
4 TABLET, ORALLY DISINTEGRATING ORAL EVERY 30 MIN PRN
Status: DISCONTINUED | OUTPATIENT
Start: 2019-09-05 | End: 2019-09-05 | Stop reason: HOSPADM

## 2019-09-05 RX ORDER — HYDRALAZINE HYDROCHLORIDE 20 MG/ML
2.5-5 INJECTION INTRAMUSCULAR; INTRAVENOUS EVERY 10 MIN PRN
Status: DISCONTINUED | OUTPATIENT
Start: 2019-09-05 | End: 2019-09-05 | Stop reason: HOSPADM

## 2019-09-05 RX ORDER — ONDANSETRON 2 MG/ML
4 INJECTION INTRAMUSCULAR; INTRAVENOUS EVERY 30 MIN PRN
Status: DISCONTINUED | OUTPATIENT
Start: 2019-09-05 | End: 2019-09-05 | Stop reason: HOSPADM

## 2019-09-05 RX ADMIN — DEXMEDETOMIDINE HYDROCHLORIDE 8 MCG: 100 INJECTION, SOLUTION INTRAVENOUS at 12:58

## 2019-09-05 RX ADMIN — MIDAZOLAM 2 MG: 1 INJECTION INTRAMUSCULAR; INTRAVENOUS at 12:41

## 2019-09-05 RX ADMIN — DEXMEDETOMIDINE HYDROCHLORIDE 8 MCG: 100 INJECTION, SOLUTION INTRAVENOUS at 13:04

## 2019-09-05 RX ADMIN — ONDANSETRON 4 MG: 2 INJECTION INTRAMUSCULAR; INTRAVENOUS at 12:57

## 2019-09-05 RX ADMIN — DEXMEDETOMIDINE HYDROCHLORIDE 4 MCG: 100 INJECTION, SOLUTION INTRAVENOUS at 13:01

## 2019-09-05 RX ADMIN — SODIUM CHLORIDE, POTASSIUM CHLORIDE, SODIUM LACTATE AND CALCIUM CHLORIDE: 600; 310; 30; 20 INJECTION, SOLUTION INTRAVENOUS at 12:40

## 2019-09-05 RX ADMIN — CEFAZOLIN SODIUM 2 G: 2 INJECTION, SOLUTION INTRAVENOUS at 12:46

## 2019-09-05 RX ADMIN — HYDROXYZINE HYDROCHLORIDE 50 MG: 50 TABLET, FILM COATED ORAL at 11:47

## 2019-09-05 RX ADMIN — LIDOCAINE HYDROCHLORIDE 60 MG: 20 INJECTION, SOLUTION INFILTRATION; PERINEURAL at 12:48

## 2019-09-05 RX ADMIN — FENTANYL CITRATE 50 MCG: 50 INJECTION, SOLUTION INTRAMUSCULAR; INTRAVENOUS at 12:46

## 2019-09-05 RX ADMIN — FENTANYL CITRATE 50 MCG: 50 INJECTION, SOLUTION INTRAMUSCULAR; INTRAVENOUS at 12:42

## 2019-09-05 RX ADMIN — FENTANYL CITRATE 50 MCG: 0.05 INJECTION, SOLUTION INTRAMUSCULAR; INTRAVENOUS at 14:16

## 2019-09-05 RX ADMIN — HYDROMORPHONE HYDROCHLORIDE 0.5 MG: 1 INJECTION, SOLUTION INTRAMUSCULAR; INTRAVENOUS; SUBCUTANEOUS at 14:05

## 2019-09-05 RX ADMIN — SCOPALAMINE 1 PATCH: 1 PATCH, EXTENDED RELEASE TRANSDERMAL at 11:46

## 2019-09-05 RX ADMIN — PROPOFOL 75 MCG/KG/MIN: 10 INJECTION, EMULSION INTRAVENOUS at 12:46

## 2019-09-05 ASSESSMENT — MIFFLIN-ST. JEOR: SCORE: 1305.96

## 2019-09-05 ASSESSMENT — LIFESTYLE VARIABLES: TOBACCO_USE: 1

## 2019-09-05 NOTE — ANESTHESIA PREPROCEDURE EVALUATION
Anesthesia Pre-Procedure Evaluation    Patient: Linda Franklin   MRN: [id number] : 1985          Preoperative Diagnosis: INTERVERTEBRAL DISC DISORDERS WITH RADICULOPATHY IN THE LUMBAR REGION    Procedure(s):  SPINAL CORD STIMULATOR EXPLANT ([employer])^    Past Medical History:   Diagnosis Date     Abnormal Pap smear      ADHD      Adjustment disorder      Anemia      Anorexia      Anxiety      Asthma      Bipolar 2 disorder (H)      Borderline personality disorder (H)      Cerebral artery occlusion with cerebral infarction (H)      Chlamydia      Chronic back pain      Depression, major      Endometriosis      Endometriosis      Genital herpes      HTN (hypertension)      Lumbar disc disease      Personality disorder (H)      PONV (postoperative nausea and vomiting)      PTSD (post-traumatic stress disorder)      PTSD (post-traumatic stress disorder)      Renal cyst      Renal cyst      Seizure (H)      Spinal cord stimulator status      Past Surgical History:   Procedure Laterality Date     ABDOMEN SURGERY       x 2     CHOLECYSTECTOMY       GYN SURGERY      Nexplanon Implanted left arm-expires 18     HC HEMORROIDECTOMY, EXTERNAL, SINGLE COLUMN/GROUP       HC INSERTION INTRAUTERINE DEVICE       PELVIS LAPAROSCOPY,DX      x3     REPLACE STIMULATOR DORSAL COLUMN N/A 2019    Procedure: EXPLANT (NUVERTRA) SPINAL CORD STIMULATOR AND REPLACEMENT, SPINAL CORD STIMULATOR, DORSAL COLUMN ([employer]);  Surgeon: Aguilar Watson MD;  Location: [facility]       Anesthesia Evaluation     . Pt has had prior anesthetic.     History of anesthetic complications   - PONV        ROS/MED HX    ENT/Pulmonary:     (+)tobacco use, asthma , . .   (-) sleep apnea   Neurologic:     (+)CVA     Cardiovascular:         METS/Exercise Tolerance:     Hematologic:         Musculoskeletal:         GI/Hepatic:        (-) GERD   Renal/Genitourinary:         Endo:         Psychiatric:     (+) psychiatric  "history anxiety and other (comment) (borderline personality d/o)      Infectious Disease:         Malignancy:         Other:    (+) H/O Chronic Pain,                               Lab Results   Component Value Date    HCG Negative 06/06/2019       Preop Vitals  BP Readings from Last 3 Encounters:   09/05/19 123/78   06/06/19 124/81    Pulse Readings from Last 3 Encounters:   06/06/19 98      Resp Readings from Last 3 Encounters:   09/05/19 16   06/06/19 19    SpO2 Readings from Last 3 Encounters:   09/05/19 100%   06/06/19 96%      Temp Readings from Last 1 Encounters:   09/05/19 36.7  C (98  F) (Oral)    Ht Readings from Last 1 Encounters:   09/05/19 1.626 m (5' 4\")      Wt Readings from Last 1 Encounters:   09/05/19 62.6 kg (138 lb)    Estimated body mass index is 23.69 kg/m  as calculated from the following:    Height as of this encounter: 1.626 m (5' 4\").    Weight as of this encounter: 62.6 kg (138 lb).       Anesthesia Plan      History & Physical Review  History and physical reviewed and following examination; no interval change.    ASA Status:  3 .    NPO Status:  > 8 hours    Plan for MAC Reason for MAC:  Deep or markedly invasive procedure (G8)  PONV prophylaxis:  Ondansetron (or other 5HT-3) and Dexamethasone or Solumedrol  Propofol      Postoperative Care  Postoperative pain management:  IV analgesics and Oral pain medications.      Consents  Anesthetic plan, risks, benefits and alternatives discussed with:  Patient..                 Sandra Do MD, MD  "

## 2019-09-05 NOTE — ANESTHESIA POSTPROCEDURE EVALUATION
Patient: Linda Franklin    Procedure(s):  SPINAL CORD STIMULATOR EXPLANT ([employer])^    Diagnosis:INTERVERTEBRAL DISC DISORDERS WITH RADICULOPATHY IN THE LUMBAR REGION  Diagnosis Additional Information: No value filed.    Anesthesia Type:  MAC    Note:  Anesthesia Post Evaluation    Patient location during evaluation: PACU  Patient participation: Able to fully participate in evaluation  Level of consciousness: awake and alert  Pain management: adequate  Airway patency: patent  Cardiovascular status: acceptable  Respiratory status: acceptable  Hydration status: acceptable  PONV: none     Anesthetic complications: None          Last vitals:  Vitals:    09/05/19 1400 09/05/19 1415 09/05/19 1430   BP: 109/79 109/83 106/65   Pulse: 78 61 53   Resp: 11 15 14   Temp:      SpO2: 100% 96% 97%         Electronically Signed By: Sandra Do MD, MD  September 5, 2019  2:52 PM

## 2019-09-05 NOTE — DISCHARGE INSTRUCTIONS
Same Day Surgery Discharge Instructions for  Sedation and General Anesthesia       It's not unusual to feel dizzy, light-headed or faint for up to 24 hours after surgery or while taking pain medication.  If you have these symptoms: sit for a few minutes before standing and have someone assist you when you get up to walk or use the bathroom.      You should rest and relax for the next 24 hours. We recommend you make arrangements to have an adult stay with you for at least 24 hours after your discharge.  Avoid hazardous and strenuous activity.      DO NOT DRIVE any vehicle or operate mechanical equipment for 24 hours following the end of your surgery.  Even though you may feel normal, your reactions may be affected by the medication you have received.      Do not drink alcoholic beverages for 24 hours following surgery.       Slowly progress to your regular diet as you feel able. It's not unusual to feel nauseated and/or vomit after receiving anesthesia.  If you develop these symptoms, drink clear liquids (apple juice, ginger ale, broth, 7-up, etc. ) until you feel better.  If your nausea and vomiting persists for 24 hours, please notify your surgeon.        All narcotic pain medications, along with inactivity and anesthesia, can cause constipation. Drinking plenty of liquids and increasing fiber intake will help.      For any questions of a medical nature, call your surgeon.      Do not make important decisions for 24 hours.      If you had general anesthesia, you may have a sore throat for a couple of days related to the breathing tube used during surgery.  You may use Cepacol lozenges to help with this discomfort.  If it worsens or if you develop a fever, contact your surgeon.       If you feel your pain is not well managed with the pain medications prescribed by your surgeon, please contact your surgeon's office to let them know so they can address your concerns.       Spinal Cord Stimulator Explant Discharge  Instructions  [facility]      After Surgery:    Resume light activities as tolerated.    Resume your regular diet.    Resume your regular pain medication as instructed by your provider at [facility].    You will be taking an oral antibiotic for 10 days after your spinal cord stimulator removal--it is important to take this medication every day until the prescription is gone.    Site Care:    DO NOT remove any bandages from the surgery.  A RN or your provider will remove the bandages at your follow-up appointment at [facility].    Your incisions have been closed with dissolvable stitches.  There are steri-strips covering your incisions under your bandages.  Do not remove these, they will fall off on their own in 5-7 days.      After the bandages are removed check your incision sties daily.  Keep sites clean and dry.    During the first few days, you may notice some swelling or discoloration around the incision site, which is normal.  Some fluid (yellow to light red to orange) may ooze or leak from the incisions.  This is generally normal.  However, if the fluid is foul smelling, thick or does not decrease in amount, call [facility].     DO NOT shower until your bandages have been removed.      No soaking your incisions for 2 weeks.  This includes baths, whirlpools, swimming pools and hot tubs.      Call [facility] during business hours or the on-call provider after hours if you develop any of the following:      Signs of infection such as : fevers, chills, increased pain, drainage (as mentioned above) redness and swelling from your incision sites,      Headaches persisting for more than 48 hours.    Unusual changes in or stopping of sensation in your legs or back.    Uncontrolled pain    Emergency Situations go to the Emergency Department or Call 911:    Sudden sever back pain    Sudden onset of leg weakness and spasms    Loss of bladder control and/or  bowel function    Clinic: [phone]  On call Provider: [phone]

## 2019-09-05 NOTE — ANESTHESIA CARE TRANSFER NOTE
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    Anesthesia Type:   MAC     Note:  Airway :Face Mask  Patient transferred to:PACU  Handoff Report: Identifed the Patient, Identified the Reponsible Provider, Reviewed the pertinent medical history, Discussed the surgical course, Reviewed Intra-OP anesthesia mangement and issues during anesthesia, Set expectations for post-procedure period and Allowed opportunity for questions and acknowledgement of understanding      Vitals: (Last set prior to Anesthesia Care Transfer)    CRNA VITALS  9/5/2019 1321 - 9/5/2019 1400      9/5/2019             Resp Rate (set):  10                Electronically Signed By: EMMETT Ibarra CRNA  September 5, 2019  2:00 PM

## 2019-09-06 NOTE — OR NURSING
Talked with pt for awhile a related to her situation. Patient stopped taking her psych med, encouraged to get back on those, talked at length regarding having no pain med to take. Patient stated that someone besides her picked up her pain medication at [facility] pharmacy. Patient teary, states that it's very difficult to get listened to. Patient denies being suicidal. Talked towards calling the Pain Clinic and see if there is anything they can suggest for pain control.   Detail Level: Detailed Detail Level: Zone Initiate Treatment: Recommend gly sal 5-2 pads once daily to area Initiate Treatment: Moisturizer care reviewed

## 2019-09-11 NOTE — OP NOTE
Name:    Linda Franklin    :    1985  Procedure Date: 2019   Surgeon:   Inna Rhodes DO   Procedure:   Lumbar and Cervical Spinal Cord Stimulator Explant  Location:   [facility]  Indication:   Intervertebral disc disorders w radiculopathy, lumbar region M51.16      Allergies:  Ingredient Reaction (Severity) Medication Name Comment   CODEINE PHOSPHATE seizure Tylenol-Codeine #3    FLUTICASONE PROPIONATE increased asthma symptoms Advair Diskus    LATEX      SALMETEROL XINAFOATE increased asthma symptoms Advair Diskus        Anesthesia: MAC sedation and local anesthetic were utilized for this procedure.     Explants: All explants were [employer] products.     Description of Procedure: After discussing potential risks and benefits, the patient did wish to proceed and signed a written consent form.  The operative site was marked in the preoperative area. The patient was brought into the operating room and positioned prone on the operating table taking care to relieve all pressure points and place extremities in a comfortable position.  MAC anesthesia was induced and the appropriate level was identified using fluoroscopy.  Inna Rhodes DO  initiated a surgical time out stating that patients name, date of birth, allergies, and procedure to be performed. Xylocaine was injected to provide local anesthesia. The left gluteal pocket with the battery was opened. The all four leads were disconnected. The IPG battery was removed. A paramedian incision was then made in the mid back over the existing lumbar entry scar.  Additional Xylocaine was injected to provide additional anesthesia.  The two 8 contact spinal cord stimulator leads were removed intact. Next, Xylocaine was injected over the upper existing paramedian scar, an incision was made, and the two 8 contact spinal cord stimulator leads were removed intact.     The wounds were then irrigated profusely with saline solution. The  incisions were closed in layers using absorbable sutures. Steri-strips and island dressings were also applied.      The patient was awakened, positioned supine, and brought back to the recovery area where they would be monitored by a registered nurse. The patient tolerated the procedure well.  The patient was able to ambulate without assistance. Post procedure instructions were given to the patient and the patient verbalized understanding. All questions were answered.    Inna Rhodes DO   September 5, 2019

## 2019-10-04 ENCOUNTER — HEALTH MAINTENANCE LETTER (OUTPATIENT)
Age: 34
End: 2019-10-04

## 2020-11-08 ENCOUNTER — HEALTH MAINTENANCE LETTER (OUTPATIENT)
Age: 35
End: 2020-11-08

## 2021-09-11 ENCOUNTER — HEALTH MAINTENANCE LETTER (OUTPATIENT)
Age: 36
End: 2021-09-11

## 2022-01-01 ENCOUNTER — HEALTH MAINTENANCE LETTER (OUTPATIENT)
Age: 37
End: 2022-01-01

## 2022-10-30 ENCOUNTER — HEALTH MAINTENANCE LETTER (OUTPATIENT)
Age: 37
End: 2022-10-30

## 2023-04-08 ENCOUNTER — HEALTH MAINTENANCE LETTER (OUTPATIENT)
Age: 38
End: 2023-04-08

## (undated) DEVICE — DRAPE LEGGINGS 8421

## (undated) DEVICE — DRAPE SHEET REV FOLD 3/4 9349

## (undated) DEVICE — PREP DURAPREP 26ML APL 8630

## (undated) DEVICE — DRAPE STERI TOWEL LG 1010

## (undated) DEVICE — Device

## (undated) DEVICE — LINEN TOWEL PACK X5 5464

## (undated) DEVICE — DRSG TEGADERM 4X4 3/4" 1626

## (undated) DEVICE — GLOVE PROTEXIS BLUE W/NEU-THERA 6.0  2D73EB60

## (undated) DEVICE — SU VICRYL 0 CT-2 CR 8X18" J727D

## (undated) DEVICE — NDL 19GA 1.5"

## (undated) DEVICE — SYR 03ML LL W/O NDL 309657

## (undated) DEVICE — SYR EAR BULB 3OZ 0035830

## (undated) DEVICE — DRSG TELFA ISLAND 4X8" 7541

## (undated) DEVICE — DRAPE COVER C-ARM SEAMLESS SNAP-KAP 03-KP26 LATEX FREE

## (undated) DEVICE — SOL WATER IRRIG 1000ML BOTTLE 2F7114

## (undated) DEVICE — TUNNELING TOOL

## (undated) DEVICE — KIT SURGICAL TURNOVER FVSD-01D

## (undated) DEVICE — PACK UNIVERSAL SPLIT 29131

## (undated) DEVICE — SUCTION CANISTER MEDIVAC LINER 3000ML W/LID 65651-530

## (undated) DEVICE — SU SILK 0 SH 30" K834H

## (undated) DEVICE — PACK MINOR SBA15MIFSE

## (undated) DEVICE — DRSG STERI STRIP 1/2X4" R1547

## (undated) DEVICE — GLOVE PROTEXIS MICRO 6.0  2D73PM60

## (undated) DEVICE — SYR 20ML LL W/O NDL 302830

## (undated) DEVICE — SYR 07ML EPIDURAL LOSS OF RESISTANCE PULSATOR 4905

## (undated) DEVICE — SU VICRYL 4-0 PS-2 18" UND J496H

## (undated) DEVICE — FREELINK REMOTE CONTROL KIT

## (undated) DEVICE — DRAPE IOBAN INCISE 23X17" 6650EZ

## (undated) DEVICE — GLOVE PROTEXIS POWDER FREE 7.0 ORTHOPEDIC 2D73ET70

## (undated) DEVICE — DRSG KERLIX FLUFFS X5

## (undated) DEVICE — SU SILK 2-0 SH CR 8X18" C012D

## (undated) RX ORDER — FENTANYL CITRATE 50 UG/ML
INJECTION, SOLUTION INTRAMUSCULAR; INTRAVENOUS
Status: DISPENSED
Start: 2019-06-06

## (undated) RX ORDER — DEXAMETHASONE SODIUM PHOSPHATE 4 MG/ML
INJECTION, SOLUTION INTRA-ARTICULAR; INTRALESIONAL; INTRAMUSCULAR; INTRAVENOUS; SOFT TISSUE
Status: DISPENSED
Start: 2019-09-05

## (undated) RX ORDER — SCOLOPAMINE TRANSDERMAL SYSTEM 1 MG/1
PATCH, EXTENDED RELEASE TRANSDERMAL
Status: DISPENSED
Start: 2019-06-06

## (undated) RX ORDER — PROPOFOL 10 MG/ML
INJECTION, EMULSION INTRAVENOUS
Status: DISPENSED
Start: 2019-06-06

## (undated) RX ORDER — ONDANSETRON 2 MG/ML
INJECTION INTRAMUSCULAR; INTRAVENOUS
Status: DISPENSED
Start: 2019-09-05

## (undated) RX ORDER — FENTANYL CITRATE 50 UG/ML
INJECTION, SOLUTION INTRAMUSCULAR; INTRAVENOUS
Status: DISPENSED
Start: 2019-09-05

## (undated) RX ORDER — SCOLOPAMINE TRANSDERMAL SYSTEM 1 MG/1
PATCH, EXTENDED RELEASE TRANSDERMAL
Status: DISPENSED
Start: 2019-09-05

## (undated) RX ORDER — HYDROXYZINE HYDROCHLORIDE 50 MG/1
TABLET, FILM COATED ORAL
Status: DISPENSED
Start: 2019-09-05

## (undated) RX ORDER — HYDROMORPHONE HYDROCHLORIDE 1 MG/ML
INJECTION, SOLUTION INTRAMUSCULAR; INTRAVENOUS; SUBCUTANEOUS
Status: DISPENSED
Start: 2019-09-05

## (undated) RX ORDER — PROPOFOL 10 MG/ML
INJECTION, EMULSION INTRAVENOUS
Status: DISPENSED
Start: 2019-09-05

## (undated) RX ORDER — CEFAZOLIN SODIUM 2 G/100ML
INJECTION, SOLUTION INTRAVENOUS
Status: DISPENSED
Start: 2019-06-06

## (undated) RX ORDER — FENTANYL CITRATE 0.05 MG/ML
INJECTION, SOLUTION INTRAMUSCULAR; INTRAVENOUS
Status: DISPENSED
Start: 2019-09-05

## (undated) RX ORDER — LIDOCAINE HYDROCHLORIDE 20 MG/ML
INJECTION, SOLUTION EPIDURAL; INFILTRATION; INTRACAUDAL; PERINEURAL
Status: DISPENSED
Start: 2019-09-05

## (undated) RX ORDER — CEFAZOLIN SODIUM 2 G/100ML
INJECTION, SOLUTION INTRAVENOUS
Status: DISPENSED
Start: 2019-09-05

## (undated) RX ORDER — ALBUTEROL SULFATE 90 UG/1
AEROSOL, METERED RESPIRATORY (INHALATION)
Status: DISPENSED
Start: 2019-09-05